# Patient Record
Sex: FEMALE
[De-identification: names, ages, dates, MRNs, and addresses within clinical notes are randomized per-mention and may not be internally consistent; named-entity substitution may affect disease eponyms.]

---

## 2022-08-28 ENCOUNTER — HOSPITAL ENCOUNTER (EMERGENCY)
Dept: HOSPITAL 97 - ER | Age: 22
LOS: 1 days | Discharge: HOME | End: 2022-08-29
Payer: COMMERCIAL

## 2022-08-28 DIAGNOSIS — E11.9: ICD-10-CM

## 2022-08-28 DIAGNOSIS — R10.11: Primary | ICD-10-CM

## 2022-08-28 LAB
ALBUMIN SERPL BCP-MCNC: 3.3 G/DL (ref 3.4–5)
ALP SERPL-CCNC: 87 U/L (ref 45–117)
ALT SERPL W P-5'-P-CCNC: 92 U/L (ref 12–78)
AST SERPL W P-5'-P-CCNC: 15 U/L (ref 15–37)
BUN BLD-MCNC: 16 MG/DL (ref 7–18)
GLUCOSE SERPLBLD-MCNC: 289 MG/DL (ref 74–106)
HCT VFR BLD CALC: 40.6 % (ref 36–45)
LIPASE SERPL-CCNC: 81 U/L (ref 73–393)
LYMPHOCYTES # SPEC AUTO: 3 K/UL (ref 0.7–4.9)
MCV RBC: 79.6 FL (ref 80–100)
PMV BLD: 8.7 FL (ref 7.6–11.3)
POTASSIUM SERPL-SCNC: 3.8 MMOL/L (ref 3.5–5.1)
RBC # BLD: 5.1 M/UL (ref 3.86–4.86)

## 2022-08-28 PROCEDURE — 80053 COMPREHEN METABOLIC PANEL: CPT

## 2022-08-28 PROCEDURE — 83690 ASSAY OF LIPASE: CPT

## 2022-08-28 PROCEDURE — 76705 ECHO EXAM OF ABDOMEN: CPT

## 2022-08-28 PROCEDURE — 96374 THER/PROPH/DIAG INJ IV PUSH: CPT

## 2022-08-28 PROCEDURE — 85025 COMPLETE CBC W/AUTO DIFF WBC: CPT

## 2022-08-28 PROCEDURE — 96375 TX/PRO/DX INJ NEW DRUG ADDON: CPT

## 2022-08-28 PROCEDURE — 96361 HYDRATE IV INFUSION ADD-ON: CPT

## 2022-08-28 PROCEDURE — 36415 COLL VENOUS BLD VENIPUNCTURE: CPT

## 2022-08-28 PROCEDURE — 99284 EMERGENCY DEPT VISIT MOD MDM: CPT

## 2022-08-28 PROCEDURE — 81003 URINALYSIS AUTO W/O SCOPE: CPT

## 2022-08-29 VITALS — DIASTOLIC BLOOD PRESSURE: 82 MMHG | SYSTOLIC BLOOD PRESSURE: 123 MMHG

## 2022-08-29 VITALS — OXYGEN SATURATION: 100 % | TEMPERATURE: 97 F

## 2022-08-29 NOTE — ER
Nurse's Notes                                                                                     

 [facility]                                                                 

Name: Eric Card                                                                               

Age: 22 yrs                                                                                       

Sex: Female                                                                                       

: 2000                                                                                   

MRN: [id number]                                                                                   

Arrival Date: 2022                                                                          

Time: 22:45                                                                                       

Account#: [id number]                                                                            

Bed 16                                                                                            

Private MD:                                                                                       

Diagnosis: Upper abdominal pain, unspecified                                                      

                                                                                                  

Presentation:                                                                                     

                                                                                             

22:59 Chief complaint: Patient states: right upper quad pain x 1 week radiating to back.      bb  

      Coronavirus screen: At this time, the client does not indicate any symptoms associated      

      with coronavirus-19. Ebola Screen: No symptoms or risks identified at this time.            

      Initial Sepsis Screen: Does the patient meet any 2 criteria? No. Patient's initial          

      sepsis screen is negative. Does the patient have a suspected source of infection? No.       

      Patient's initial sepsis screen is negative. Risk Assessment: Do you want to hurt           

      yourself or someone else? Patient reports no desire to harm self or others. Onset of        

      symptoms was 2022.                                                               

22:59 Method Of Arrival: Ambulatory                                                           bb  

22:59 Acuity: SARAH 3                                                                           bb  

                                                                                                  

OB/GYN:                                                                                           

23:01 LMP 2022                                                                           bb  

                                                                                                  

Historical:                                                                                       

- Allergies:                                                                                      

23:01 No Known Allergies;                                                                     bb  

- Home Meds:                                                                                      

23:01 Metformin Oral [Active];                                                                bb  

- PMHx:                                                                                           

23:01 Diabetes mellitus;                                                                      bb  

- PSHx:                                                                                           

23:01 None;                                                                                   bb  

                                                                                                  

- Social history:: Smoking status: unknown.                                                       

                                                                                                  

                                                                                                  

Screenin:16 Abuse screen: Denies threats or abuse.                                                  ha1 

23:16 Nutritional screening: No deficits noted. Tuberculosis screening: No symptoms or risk   ha1 

      factors identified. Fall Risk None identified. Gait- Normal/Bed Rest/Wheelchair (0 pts).    

                                                                                                  

Assessment:                                                                                       

23:16 General: Appears comfortable, Behavior is calm, cooperative. Pain: Complains of pain in ha1 

      right upper quadrant of abdomen Pain radiates to the right shoulder Pain at worst was 9     

      out of 10 on a pain scale. Quality of pain is described as crampy, Pain began over a        

      week Is intermittent, Alleviated by medications, rest, Aggravated by eating, increased      

      activity, Also complains of nausea. Neuro: No deficits noted. Level of Consciousness is     

      awake, alert, obeys commands, Oriented to person, place, time, situation, Gait is           

      steady. Cardiovascular: No deficits noted. Denies chest pain, shortness of breath,          

      Heart tones S1 S2 present Capillary refill < 3 seconds. Respiratory: No deficits noted.     

      Airway is patent Respiratory effort is even, unlabored, Respiratory pattern is regular,     

      symmetrical. GI: Abdomen is non-distended, obese, Bowel sounds present X 4 quads.           

      Reports upper abdominal pain. : No signs and/or symptoms were reported regarding the      

      genitourinary system. EENT: No signs and/or symptoms were reported regarding the EENT       

      system. Derm: Skin is intact, is healthy with good turgor, Skin is pink, warm \T\ dry.      

      Musculoskeletal: No deficits noted. No signs and/or symptoms reported regarding the         

      musculoskeletal system. Capillary refill < 3 seconds, Range of motion: intact in all        

      extremities.                                                                                

                                                                                             

00:54 Reassessment: Patient and/or family updated on plan of care and expected duration. Pain ha1 

      level reassessed. Patient is alert, oriented x 3, equal unlabored respirations, skin        

      warm/dry/pink. being discharged Patient states feeling better.                              

                                                                                                  

Vital Signs:                                                                                      

                                                                                             

22:59  / 85; Pulse 108; Resp 22 S; Temp 97(TE); Pulse Ox 100% on R/A; Weight 104.33 kg  bb  

      (R); Height 5 ft. 5 in. (165.10 cm) (R);                                                    

23:16  / 85; Pulse 100; Resp 20 S; Pulse Ox 100% on R/A; Pain 7/10;                     ha1 

                                                                                             

00:20  / 82; Pulse 99; Resp 20 S; Pulse Ox 100% on R/A; Pain 0/10;                      ha1 

                                                                                             

22:59 Body Mass Index 38.27 (104.33 kg, 165.10 cm)                                              

                                                                                                  

ED Course:                                                                                        

                                                                                             

22:45 Patient arrived in ED.                                                                  ja2 

22:53 [facility]                                                        kb  

22:53 Sampson Dwyer MD is Attending Physician.                                              kb  

23:01 Triage completed.                                                                       bb  

23:01 Arm band placed on Patient placed in an exam room, on a stretcher, on pulse oximetry.   bb  

23:14 Lipase Sent.                                                                            5 

23:14 CMP Sent.                                                                               5 

23:14 CBC with Diff Sent.                                                                     5 

23:14 Initial lab(s) drawn, by me, sent to lab. Inserted saline lock: 22 gauge in right       mh5 

      antecubital area, using aseptic technique. Blood collected.                                 

23:29 Lorenza Sahu, RN is Primary Nurse.                                                   chencho  

23:39 Abdomen Limited US In Process Unspecified.                                              EDMS

23:50 Soraida Munguia RN is Primary Nurse.                                                      ha1 

                                                                                             

01:03 No provider procedures requiring assistance completed. IV discontinued, intact,         ha1 

      bleeding controlled, No redness/swelling at site. Pressure dressing applied.                

01:05 Patient has correct armband on for positive identification. Placed in gown. Bed in low  ha1 

      position. Call light in reach. Side rails up X 1.                                           

                                                                                                  

Administered Medications:                                                                         

                                                                                             

23:50 Drug: Ketorolac 30 mg Route: IVP; Site: right antecubital;                              ha1 

                                                                                             

00:41 Follow up: Response: No adverse reaction; Pain is decreased                             ha1 

                                                                                             

23:51 Drug: NS 0.9% 1000 ml Route: IV; Rate: 1 bolus; Site: right antecubital;                ha1 

                                                                                             

01:07 Follow up: Response: No adverse reaction; IV Status: Completed infusion; IV Intake:     ha1 

      1000ml                                                                                      

                                                                                             

23:51 Drug: Pepcid (famotidine) 20 mg Route: IVP; Site: right antecubital;                    ha1 

                                                                                             

00:41 Follow up: Response: No adverse reaction                                                ha1 

                                                                                             

23:51 Drug: Zofran (Ondansetron) 4 mg Route: IVP; Site: right antecubital;                    ha1 

                                                                                             

00:41 Follow up: Response: No adverse reaction; Nausea is decreased                           ha1 

                                                                                                  

                                                                                                  

Medication:                                                                                       

01:06 VIS not applicable for this client.                                                     ha1 

                                                                                                  

Intake:                                                                                           

01:07 IV: 1000ml; Total: 1000ml.                                                              ha1 

                                                                                                  

Outcome:                                                                                          

00:36 Discharge ordered by MD. marks  

01:04 Discharged to home ambulatory, with family.                                             ha1 

01:04 Condition: stable                                                                           

01:04 Discharge instructions given to patient, Instructed on discharge instructions, follow       

      up and referral plans. medication usage, Demonstrated understanding of instructions,        

      follow-up care, medications, Prescriptions given X 1.                                       

01:07 Patient left the ED.                                                                    ha1 

                                                                                                  

Signatures:                                                                                       

Dispatcher [employer]                           EDMS                                                 

Avani Perez, ADONISP-C                 FNP-Lorenza Solis RN RN bb Martinez, Maria                              [facility]                                                  

HiramMaria Isabel Heidy RN                        RN   ha1                                                  

                                                                                                  

Corrections: (The following items were deleted from the chart)                                    

01:07 00:42 Response: No adverse reaction; IV Intake: 1000ml ha1                              ha1 

                                                                                                  

**************************************************************************************************

## 2022-08-29 NOTE — RAD REPORT
EXAM DESCRIPTION:  US - Abdomen Exam Limited - 8/29/2022 12:06 am

 

CLINICAL HISTORY:  22 years Female, ABD PAIN

 

TECHNIQUE:  Real-time transabdominal imaging of the right upper quadrant was performed.

 

COMPARISON:  None.

 

FINDINGS:  LIVER: Not imaged in its entirety.

GALLBLADDER: Contracted gallbladder limiting detailed evaluation. No definite biliary sludge or shado
wing gallstones. No abnormal wall thickening or pericholecystic fluid.    Sonographic Woodall sign: Ne
gative.

CBD/BILE DUCT: The common bile duct is not dilated, measuring 3   mm. No intra- or extrahepatic ducta
l dilatation.

PANCREAS: Nonvisualized secondary to obscuration by overlying bowel gas.

OTHER: Noncontributory.

 

IMPRESSION:  1.   Contracted gallbladder limiting detailed evaluation.

 

2.   No cholelithiasis or sonographic evidence for cholecystitis.

 

Electronically signed by:   Tolu Becerra MD   8/28/2022 11:57 PM CDT Workstation: [id number]

 

 

 

Due to temporary technical issues with the PACS/Fluency reporting system, reports are being signed by
 the in house radiologists without review as a courtesy to insure prompt reporting. The interpreting 
radiologist is fully responsible for the content of the report.

## 2022-08-29 NOTE — EDPHYS
Physician Documentation                                                                           

 [facility]                                                                 

Name: Eric Card                                                                               

Age: 22 yrs                                                                                       

Sex: Female                                                                                       

: 2000                                                                                   

MRN: [id number]                                                                                   

Arrival Date: 2022                                                                          

Time: 22:45                                                                                       

Account#: [id number]                                                                            

Bed 16                                                                                            

Private MD:                                                                                       

ED Physician Sampson Dwyer                                                                       

HPI:                                                                                              

                                                                                             

23:54 This 22 yrs old  Female presents to ER via Ambulatory with complaints of Flank  kb  

      Pain.                                                                                       

23:54 The patient has not experienced similar symptoms in the past.                           kb  

23:54 The patient presents with abdominal pain in the right upper quadrant. Onset: The        kb  

      symptoms/episode began/occurred 1 week(s) ago. The symptoms radiate to right back.          

      Associated signs and symptoms: none. The symptoms are described as constant. Modifying      

      factors: The symptoms are alleviated by nothing, the symptoms are aggravated by             

      breathing deeply, pressure. Severity of pain: At its worst the pain was moderate in the     

      emergency department the pain is unchanged. The patient has not recently seen a             

      physician. Pt reports RUQ pain that started a week ago. States the pain is constant,        

      worse when laying flat. Pain radiates to back and right shoulder. .                         

                                                                                                  

OB/GYN:                                                                                           

23:01 LMP 2022                                                                           bb  

                                                                                                  

Historical:                                                                                       

- Allergies:                                                                                      

23:01 No Known Allergies;                                                                     bb  

- Home Meds:                                                                                      

23:01 Metformin Oral [Active];                                                                bb  

- PMHx:                                                                                           

23:01 Diabetes mellitus;                                                                      bb  

- PSHx:                                                                                           

23:01 None;                                                                                   bb  

                                                                                                  

- Social history:: Smoking status: unknown.                                                       

                                                                                                  

                                                                                                  

ROS:                                                                                              

23:54 Constitutional: Negative for fever, chills, and weight loss.                            kb  

23:54 Abdomen/GI: Positive for abdominal pain, Negative for nausea, vomiting, and diarrhea.       

23:54 All other systems are negative.                                                             

                                                                                                  

Exam:                                                                                             

23:54 Constitutional:  This is a well developed, well nourished patient who is awake, alert,  kb  

      and in no acute distress. Head/Face:  Normocephalic, atraumatic. ENT:  Moist Mucous         

      membranes Cardiovascular:  Regular rate and rhythm with a normal S1 and S2.  No             

      gallops, murmurs, or rubs.  No pulse deficits. Respiratory:  Respirations even and          

      unlabored. No increased work of breathing. Talking in full sentences Skin:  Warm, dry       

      with normal turgor.  Normal color. MS/ Extremity:  Pulses equal, no cyanosis.               

      Neurovascular intact.  Full, normal range of motion. Neuro:  Awake and alert, GCS 15,       

      oriented to person, place, time, and situation. Moves all extremities. Normal gait.         

      Psych:  Awake, alert, with orientation to person, place and time.  Behavior, mood, and      

      affect are within normal limits.                                                            

23:54 Abdomen/GI: Inspection: abdomen appears normal, Bowel sounds: normal, in all quadrants,     

      Palpation: soft, in all quadrants, moderate abdominal tenderness, in the right upper        

      quadrant.                                                                                   

                                                                                                  

Vital Signs:                                                                                      

22:59  / 85; Pulse 108; Resp 22 S; Temp 97(TE); Pulse Ox 100% on R/A; Weight 104.33 kg  bb  

      (R); Height 5 ft. 5 in. (165.10 cm) (R);                                                    

23:16  / 85; Pulse 100; Resp 20 S; Pulse Ox 100% on R/A; Pain 7/10;                     ha1 

                                                                                             

00:20  / 82; Pulse 99; Resp 20 S; Pulse Ox 100% on R/A; Pain 0/10;                      ha1 

                                                                                             

22:59 Body Mass Index 38.27 (104.33 kg, 165.10 cm)                                            bb  

                                                                                                  

MDM:                                                                                              

                                                                                             

22:59 Patient medically screened.                                                             kb  

23:53 Data reviewed: vital signs, nurses notes. Data interpreted: Pulse oximetry: on room air kb  

      is 100 %. Interpretation: normal.                                                           

                                                                                             

00:36 Counseling: I had a detailed discussion with the patient and/or guardian regarding: the kb  

      historical points, exam findings, and any diagnostic results supporting the                 

      discharge/admit diagnosis, lab results, radiology results, the need for outpatient          

      follow up, a gastroenterologist, to return to the emergency department if symptoms          

      worsen or persist or if there are any questions or concerns that arise at home.             

                                                                                                  

                                                                                             

22:59 Order name: CBC with Diff; Complete Time: 23:35                                         kb  

                                                                                             

22:59 Order name: CMP; Complete Time: 23:50                                                   kb  

                                                                                             

22:59 Order name: Lipase; Complete Time: 23:50                                                kb  

                                                                                             

22:59 Order name: Abdomen Limited US                                                          kb  

                                                                                             

00:54 Order name: Urine Dipstick-Ancillary                                                    EDMS

                                                                                             

22:59 Order name: IV Saline Lock; Complete Time: 23:14                                        kb  

                                                                                             

22:59 Order name: Labs collected and sent; Complete Time: 23:14                               kb  

                                                                                                  

Administered Medications:                                                                         

                                                                                             

23:50 Drug: Ketorolac 30 mg Route: IVP; Site: right antecubital;                              1 

                                                                                             

00:41 Follow up: Response: No adverse reaction; Pain is decreased                             ha                                                                                             

23:51 Drug: NS 0.9% 1000 ml Route: IV; Rate: 1 bolus; Site: right antecubital;                1 

                                                                                             

01:07 Follow up: Response: No adverse reaction; IV Status: Completed infusion; IV Intake:     ha1 

      1000ml                                                                                      

                                                                                             

23:51 Drug: Pepcid (famotidine) 20 mg Route: IVP; Site: right antecubital;                    1 

                                                                                             

00:41 Follow up: Response: No adverse reaction                                                ha                                                                                             

23:51 Drug: Zofran (Ondansetron) 4 mg Route: IVP; Site: right antecubital;                    ha1 

                                                                                             

00:41 Follow up: Response: No adverse reaction; Nausea is decreased                            

                                                                                                  

                                                                                                  

Disposition:                                                                                      

02:03 Co-signature as Attending Physician, Sampson Dwyer MD I agree with the assessment and   kdr 

      plan of care.                                                                               

                                                                                                  

Disposition Summary:                                                                              

22 00:36                                                                                    

Discharge Ordered                                                                                 

      Location: Home                                                                          kb  

      Condition: Stable                                                                       kb  

      Diagnosis                                                                                   

        - Upper abdominal pain, unspecified                                                   kb  

      Followup:                                                                               kb  

        - With: Emergency Department                                                               

        - When: As needed                                                                          

        - Reason: Worsening of condition                                                           

      Followup:                                                                               kb  

        - With: Private Physician                                                                  

        - When: 2 - 3 days                                                                         

        - Reason: Recheck today's complaints, Continuance of care, Re-evaluation by your           

      physician                                                                                   

      Discharge Instructions:                                                                     

        - Discharge Summary Sheet                                                             kb  

        - Biliary Colic, Adult                                                                kb  

        - Abdominal Pain, Adult, Easy-to-Read                                                 kb  

      Forms:                                                                                      

        - Medication Reconciliation Form                                                      kb  

        - Thank You Letter                                                                    kb  

        - Antibiotic Education                                                                kb  

        - Prescription Opioid Use                                                             kb  

      Prescriptions:                                                                              

        - dicyclomine 20 mg Oral Tablet                                                            

            - take 1 tablet by ORAL route 4 times per day As needed; 20 tablet; Refills: 0,   kb  

      Product Selection Permitted                                                                 

Signatures:                                                                                       

Dispatcher [employer]                           Avani Gary, MAVIS-C                 ADONISP-Sampson Okeefe MD MD kdr Ballard, Brenda, RN                     RN   bb                                                   

Soraida Munguia RN                        RN   ha1                                                  

                                                                                                  

**************************************************************************************************